# Patient Record
(demographics unavailable — no encounter records)

---

## 2025-02-24 NOTE — HISTORY OF PRESENT ILLNESS
[de-identified] : Three days ago the patient complained of pain in his R ear and admitted to putting a pea in his ear several weeks ago; they were seen in two EDs where extraction wasn't completed. No c/o hearing loss and there's no suspicion that he put anything in his nose or the other ear.

## 2025-02-24 NOTE — PHYSICAL EXAM
[Binocular Microscopic Exam] : Binocular microscopic exam was performed [[email or web address]] : an oblong hard object was removed from the deep canal with a hook [Normal] : no masses and lesions seen, face is symmetric